# Patient Record
Sex: MALE | Race: AMERICAN INDIAN OR ALASKA NATIVE
[De-identification: names, ages, dates, MRNs, and addresses within clinical notes are randomized per-mention and may not be internally consistent; named-entity substitution may affect disease eponyms.]

---

## 2019-03-05 ENCOUNTER — HOSPITAL ENCOUNTER (EMERGENCY)
Dept: HOSPITAL 5 - ED | Age: 44
Discharge: HOME | End: 2019-03-05
Payer: COMMERCIAL

## 2019-03-05 VITALS — DIASTOLIC BLOOD PRESSURE: 84 MMHG | SYSTOLIC BLOOD PRESSURE: 135 MMHG

## 2019-03-05 DIAGNOSIS — F17.200: ICD-10-CM

## 2019-03-05 DIAGNOSIS — M54.5: Primary | ICD-10-CM

## 2019-03-05 PROCEDURE — 99283 EMERGENCY DEPT VISIT LOW MDM: CPT

## 2019-03-05 PROCEDURE — 72100 X-RAY EXAM L-S SPINE 2/3 VWS: CPT

## 2019-03-05 NOTE — EMERGENCY DEPARTMENT REPORT
ED Back Pain/Injury HPI





- General


Chief Complaint: Back Pain/Injury


Stated Complaint: BACK PAIN


Time Seen by Provider: 03/05/19 18:44


Source: patient


Limitations: No Limitations





- History of Present Illness


Initial Comments: 





Pt is a 44 yo male who presents to the ED with c/o lower back pain. The patient 

states he has had chronic lower back pain for 20 years which occasionally flares

up. He states this feels like his prior episodes. The patient states he used to 

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 

first started to have back pain. He denies any numbness, weakness, tingling. He 

denies any new injury or fall. He states he has never seen an orthopedic or pain

doctor. Pt has not tried any treatments at home to relieve his pain. 


MD Complaint: back pain


Similar Symptoms Previously: Yes


Radiation: none


Severity: moderate


Severity scale (0 -10): 5


Quality: sharp, aching


Consistency: intermittent


Improves With: other (rest)


Worsens With: other (movements )


Associated Symptoms: denies other symptoms





- Related Data


                                  Previous Rx's











 Medication  Instructions  Recorded  Last Taken  Type


 


Cyclobenzaprine HCl [Flexeril 5 MG 5 mg PO HS PRN #5 tablet 03/05/19 Unknown Rx





TAB]    


 


RX: Ibuprofen 800 mg PO Q6HR PRN #20 tablet 03/05/19 Unknown Rx











                                    Allergies











Allergy/AdvReac Type Severity Reaction Status Date / Time


 


No Known Allergies Allergy   Verified 03/05/19 22:30














ED Review of Systems


ROS: 


Stated complaint: BACK PAIN


Other details as noted in HPI





Comment: All other systems reviewed and negative





ED Past Medical Hx





- Past Medical History


Previous Medical History?: No





- Surgical History


Past Surgical History?: No





- Social History


Smoking Status: Current Every Day Smoker


Substance Use Type: Alcohol, Marijuana





- Medications


Home Medications: 


                                Home Medications











 Medication  Instructions  Recorded  Confirmed  Last Taken  Type


 


Cyclobenzaprine HCl [Flexeril 5 MG 5 mg PO HS PRN #5 tablet 03/05/19  Unknown Rx





TAB]     


 


RX: Ibuprofen 800 mg PO Q6HR PRN #20 tablet 03/05/19  Unknown Rx














ED Physical Exam





- General


Limitations: No Limitations


General appearance: alert, in no apparent distress





- Head


Head exam: Present: atraumatic, normocephalic





- Eye


Eye exam: Present: normal appearance





- ENT


ENT exam: Present: mucous membranes moist





- Neck


Neck exam: Present: normal inspection, full ROM





- Respiratory


Respiratory exam: Absent: respiratory distress





- Cardiovascular


Cardiovascular Exam: Present: regular rate





- GI/Abdominal


GI/Abdominal exam: Present: soft.  Absent: distended, tenderness





- Back Exam


Back exam: Present: normal inspection, full ROM, paraspinal tenderness 

(bilateral lumbar paraspinal TTP )





- Neurological Exam


Neurological exam: Present: alert, oriented X3, normal gait, reflexes normal.  

Absent: motor sensory deficit





- Psychiatric


Psychiatric exam: Present: normal affect, normal mood





- Skin


Skin exam: Present: warm, dry, intact





ED Course


                                   Vital Signs











  03/05/19 03/05/19





  18:43 22:20


 


Temperature 97.8 F 


 


Pulse Rate 75 79


 


Respiratory 16 16





Rate  


 


Blood Pressure 135/84 


 


O2 Sat by Pulse 98 98





Oximetry  














ED Medical Decision Making





- Medical Decision Making





Pt is a 44 yo male with a hx of chronic back pain for the last 20 years. He 

states this feels the same as his previous flares. He denies any new injury or 

fall. He denies any numbness, weakness, tingling, or radiation of the pain. XR 

of the lumbar spine is normal. Will give pt ibuprofen and small amount of 

flexeril to take at night. Advised pt not to drive on flexeril. Pt will need to 

follow up with PCP in the next 2 days. Pt can follow up with elina Quintanilla 

if still continuing to have issues. Advised pt to return to the ED if new or 

worsening sx. 


Critical care attestation.: 


If time is entered above; I have spent that time in minutes in the direct care 

of this critically ill patient, excluding procedure time.








ED Disposition


Clinical Impression: 


Chronic back pain


Qualifiers:


 Back pain location: low back pain Back pain laterality: bilateral Sciatica 

presence: without sciatica Qualified Code(s): M54.5 - Low back pain





Disposition: DC-01 TO HOME OR SELFCARE


Is pt being admited?: No


Does the pt Need Aspirin: No


Condition: Stable


Instructions:  Chronic Back Pain (ED)


Additional Instructions: 


Follow up with elina Quintanilla. Advised to return to the ED if new/worsening 

sx. 


Prescriptions: 


Cyclobenzaprine HCl [Flexeril 5 MG TAB] 5 mg PO HS PRN #5 tablet


 PRN Reason: Muscle Spasm


RX: Ibuprofen 800 mg PO Q6HR PRN #20 tablet


 PRN Reason: Pain, Mild (1-3)


[facility] 


[facility] - [facility]-5 Days


Time of Disposition: 22:09


Print Language: ENGLISH

## 2019-03-05 NOTE — EMERGENCY DEPARTMENT REPORT
Blank Doc





- Documentation


Documentation: 





This is a 43 y.o. male that presents to ED with acute on chronic low back pain. 

Pain worse with movement.  Taking NSAIDs w/o improvement of symptoms.  History 

of bulging disc.  Recently moved here from [place].





Ordered xr of L-spine.





Fast track for further evaluation.

## 2019-03-05 NOTE — XRAY REPORT
PROCEDURE: LUMBAR SPINE, 2 VIEWS 

 

TECHNIQUE:  Lumbar spine radiographs, frontal and lateral views. CPT 91166 

 

HISTORY: Back pain 

 

COMPARISONS:  None . 

 

FINDINGS: 

 

Alignment:  Normal . 

Vertebral body heights/Disk spaces:  Normal . 

Fracture(s):  None . 

Facets:  Normal . 

Bone mineralization:  Normal . 

 

IMPRESSION:  Normal Examination . 

 

This document is electronically signed by Avtar Marques MD., March 5 2019 08:17:00 PM ET

## 2023-07-24 ENCOUNTER — OFFICE VISIT (OUTPATIENT)
Dept: URBAN - METROPOLITAN AREA CLINIC 74 | Facility: CLINIC | Age: 48
End: 2023-07-24

## 2023-08-18 ENCOUNTER — OFFICE VISIT (OUTPATIENT)
Dept: URBAN - METROPOLITAN AREA CLINIC 40 | Facility: CLINIC | Age: 48
End: 2023-08-18

## 2023-08-18 NOTE — HPI-TODAY'S VISIT:
47-year-old male with past medical history as listed below, new patient who no showed appointment with Dr. Calderon last month.

## 2023-10-12 ENCOUNTER — DASHBOARD ENCOUNTERS (OUTPATIENT)
Age: 48
End: 2023-10-12

## 2023-10-17 ENCOUNTER — OFFICE VISIT (OUTPATIENT)
Dept: URBAN - METROPOLITAN AREA CLINIC 40 | Facility: CLINIC | Age: 48
End: 2023-10-17

## 2023-10-17 RX ORDER — IBUPROFEN 600 MG/1
TABLET, FILM COATED ORAL
Qty: 30 TABLET | Status: ACTIVE | COMMUNITY